# Patient Record
Sex: MALE | Race: BLACK OR AFRICAN AMERICAN | NOT HISPANIC OR LATINO | Employment: FULL TIME | URBAN - METROPOLITAN AREA
[De-identification: names, ages, dates, MRNs, and addresses within clinical notes are randomized per-mention and may not be internally consistent; named-entity substitution may affect disease eponyms.]

---

## 2020-12-08 LAB — CRC RECOMMENDATION EXT: NORMAL

## 2022-10-31 DIAGNOSIS — G31.84 MILD COGNITIVE IMPAIRMENT: Primary | ICD-10-CM

## 2023-01-19 ENCOUNTER — OFFICE VISIT (OUTPATIENT)
Dept: NEUROLOGY | Facility: CLINIC | Age: 63
End: 2023-01-19
Payer: OTHER GOVERNMENT

## 2023-01-19 VITALS
DIASTOLIC BLOOD PRESSURE: 74 MMHG | SYSTOLIC BLOOD PRESSURE: 110 MMHG | HEIGHT: 71 IN | BODY MASS INDEX: 28 KG/M2 | WEIGHT: 200 LBS

## 2023-01-19 DIAGNOSIS — G31.84 MILD COGNITIVE IMPAIRMENT: Primary | ICD-10-CM

## 2023-01-19 PROCEDURE — 99999 PR PBB SHADOW E&M-EST. PATIENT-LVL III: ICD-10-PCS | Mod: PBBFAC,,, | Performed by: PSYCHIATRY & NEUROLOGY

## 2023-01-19 PROCEDURE — 99204 OFFICE O/P NEW MOD 45 MIN: CPT | Mod: S$PBB,,, | Performed by: PSYCHIATRY & NEUROLOGY

## 2023-01-19 PROCEDURE — 99999 PR PBB SHADOW E&M-EST. PATIENT-LVL III: CPT | Mod: PBBFAC,,, | Performed by: PSYCHIATRY & NEUROLOGY

## 2023-01-19 PROCEDURE — 99204 PR OFFICE/OUTPT VISIT, NEW, LEVL IV, 45-59 MIN: ICD-10-PCS | Mod: S$PBB,,, | Performed by: PSYCHIATRY & NEUROLOGY

## 2023-01-19 PROCEDURE — 99213 OFFICE O/P EST LOW 20 MIN: CPT | Mod: PBBFAC | Performed by: PSYCHIATRY & NEUROLOGY

## 2023-01-19 RX ORDER — UBIDECARENONE 75 MG
500 CAPSULE ORAL DAILY
COMMUNITY

## 2023-01-19 RX ORDER — ESOMEPRAZOLE MAGNESIUM 40 MG/1
1 CAPSULE, DELAYED RELEASE ORAL DAILY
COMMUNITY
End: 2023-09-13 | Stop reason: SDUPTHER

## 2023-01-19 RX ORDER — ATORVASTATIN CALCIUM 20 MG/1
20 TABLET, FILM COATED ORAL NIGHTLY
COMMUNITY
Start: 2023-01-02 | End: 2023-06-24 | Stop reason: SDUPTHER

## 2023-01-19 RX ORDER — FLAXSEED OIL 1000 MG
1 CAPSULE ORAL DAILY
COMMUNITY

## 2023-01-19 RX ORDER — AMOXICILLIN 500 MG
1 CAPSULE ORAL DAILY
COMMUNITY

## 2023-01-19 RX ORDER — TELMISARTAN AND HYDROCHLORTHIAZIDE 80; 12.5 MG/1; MG/1
0.5 TABLET ORAL DAILY
COMMUNITY
Start: 2023-01-03

## 2023-01-19 RX ORDER — VITAMIN K2 40 MCG
500 TABLET ORAL DAILY
COMMUNITY

## 2023-01-19 RX ORDER — FOLIC ACID 0.4 MG
400 TABLET ORAL DAILY
COMMUNITY

## 2023-01-19 RX ORDER — NAPROXEN SODIUM 220 MG/1
1 TABLET, FILM COATED ORAL DAILY
COMMUNITY

## 2023-01-19 RX ORDER — MONTELUKAST SODIUM 10 MG/1
10 TABLET ORAL DAILY
COMMUNITY
Start: 2023-01-13 | End: 2023-09-13 | Stop reason: SDUPTHER

## 2023-01-19 RX ORDER — TAMSULOSIN HYDROCHLORIDE 0.4 MG/1
1 CAPSULE ORAL NIGHTLY
COMMUNITY
Start: 2022-09-08

## 2023-01-19 RX ORDER — IBUPROFEN 800 MG/1
1 TABLET ORAL DAILY PRN
COMMUNITY
Start: 2022-08-05

## 2023-01-19 RX ORDER — TRAMADOL HYDROCHLORIDE 50 MG/1
1 TABLET ORAL
COMMUNITY
End: 2023-09-13

## 2023-01-19 RX ORDER — TADALAFIL 10 MG/1
10 TABLET ORAL
COMMUNITY
Start: 2023-01-13 | End: 2023-09-13 | Stop reason: ALTCHOICE

## 2023-01-19 RX ORDER — FINASTERIDE 5 MG/1
5 TABLET, FILM COATED ORAL DAILY
COMMUNITY
Start: 2022-12-16

## 2023-01-19 RX ORDER — VIT C/E/ZN/COPPR/LUTEIN/ZEAXAN 250MG-90MG
1000 CAPSULE ORAL DAILY
COMMUNITY

## 2023-01-19 NOTE — PROGRESS NOTES
Subjective:       Patient ID: Joey Hunt is a 62 y.o. male.    Chief Complaint: Mild cognitive impairment (New Pt - Referred by [facility] - Neuro Consult - MCI/MMSE 28/30)    HPI  2 concussions in the service in the 1970s  Lost consciousnsess with one  Never required hospitalized  Was always able to hold a job, pay bills, function in society  He has some mild memory issues  ;feels lke it is getting worse  Alzheimer's does not run in the family  Misplaces things at home  Driving is ok; occ misses a turn  Works in [employer]; does ok  Mmse 28/30; normal spatial; haim a more cmplicated picture than the task    Mri report only; was done as well outpatient; posterior WM changes; [facility]    Review of Systems  Endorses anxiety    The remainder of the 14 system ROS is noncontributory or negative unless mentioned/reviewed above.    Objective:      Physical Exam  Mental Status: Alert and oriented x3. Language is fluent with good comprehension.    Cranial Nerve: Pupils are equal, round, and reactive to light. Visual fields are intact to confrontation. Normal fundi. Ocular movements are intact. Face is symmetric at rest and with activation with intact sensation throughout. Hearing intact to finger rub bilaterally. Muscles of tongue and palate activate symmetrically. No dysarthria. Strength is full in sternocleidomastoid and trapezius bilaterally.    Motor: Muscle bulk and tone are normal. Strength is 5/5 in all four extremities both proximally and distally. Intact fine motor movements bilaterally. There is no pronator drift or satelliting on arm roll.    Sensory: Sensation is intact to light touch, pinprick, vibration, and proprioception throughout. Romberg is negative.    Reflexes: 2+ and symmetric at the biceps, triceps, brachioradialis, patella, and Achilles bilaterally. Plantar response is flexor bilaterally.    Coordination: No dysmetria on finger-nose-finger or heel-knee-shin.  Normal rapid alternating movements. Fast finger tapping with normal amplitude and speed.    Gait: Narrow based with normal stride length and good arm swing bilaterally. Able to walk on heels, toes, and in tandem.      Assessment:       Problem List Items Addressed This Visit    None      Plan:       Vascular MCI  Control Rfs  Exercise  Continue supplements  Occ tension HA

## 2023-06-24 DIAGNOSIS — E78.5 HYPERLIPIDEMIA, UNSPECIFIED HYPERLIPIDEMIA TYPE: Primary | ICD-10-CM

## 2023-06-24 RX ORDER — ATORVASTATIN CALCIUM 20 MG/1
20 TABLET, FILM COATED ORAL NIGHTLY
Qty: 90 TABLET | Refills: 1 | Status: SHIPPED | OUTPATIENT
Start: 2023-06-24 | End: 2023-09-13 | Stop reason: SDUPTHER

## 2023-08-16 LAB
CHOLESTEROL, TOTAL: 145
HDLC SERPL-MCNC: 63 MG/DL (ref 35–70)
LDLC SERPL CALC-MCNC: 61 MG/DL (ref 0–160)
TRIGL SERPL-MCNC: 121 MG/DL (ref 40–160)
VLDLC SERPL-MCNC: 21 MG/DL

## 2023-08-18 ENCOUNTER — DOCUMENTATION ONLY (OUTPATIENT)
Dept: FAMILY MEDICINE | Facility: CLINIC | Age: 63
End: 2023-08-18
Payer: COMMERCIAL

## 2023-09-08 PROBLEM — F10.11 H/O ETOH ABUSE: Status: ACTIVE | Noted: 2023-09-08

## 2023-09-08 PROBLEM — E78.00 HYPERCHOLESTEROLEMIA: Status: RESOLVED | Noted: 2023-09-08 | Resolved: 2023-09-08

## 2023-09-08 PROBLEM — E78.2 MIXED HYPERLIPIDEMIA: Status: ACTIVE | Noted: 2023-09-08

## 2023-09-08 PROBLEM — E78.00 HYPERCHOLESTEROLEMIA: Status: ACTIVE | Noted: 2023-09-08

## 2023-09-08 PROBLEM — R79.89 ELEVATED LFTS: Status: ACTIVE | Noted: 2023-09-08

## 2023-09-08 PROBLEM — E53.8 FOLATE DEFICIENCY: Status: ACTIVE | Noted: 2023-09-08

## 2023-09-08 PROBLEM — K21.9 GERD (GASTROESOPHAGEAL REFLUX DISEASE): Status: ACTIVE | Noted: 2023-09-08

## 2023-09-08 PROBLEM — I10 HTN (HYPERTENSION): Status: ACTIVE | Noted: 2023-09-08

## 2023-09-08 PROBLEM — E55.9 VITAMIN D DEFICIENCY: Status: ACTIVE | Noted: 2023-09-08

## 2023-09-13 ENCOUNTER — OFFICE VISIT (OUTPATIENT)
Dept: FAMILY MEDICINE | Facility: CLINIC | Age: 63
End: 2023-09-13
Payer: COMMERCIAL

## 2023-09-13 VITALS
RESPIRATION RATE: 20 BRPM | TEMPERATURE: 98 F | HEIGHT: 70 IN | DIASTOLIC BLOOD PRESSURE: 88 MMHG | OXYGEN SATURATION: 98 % | BODY MASS INDEX: 27.63 KG/M2 | WEIGHT: 193 LBS | HEART RATE: 80 BPM | SYSTOLIC BLOOD PRESSURE: 138 MMHG

## 2023-09-13 DIAGNOSIS — E78.2 MIXED HYPERLIPIDEMIA: Primary | ICD-10-CM

## 2023-09-13 DIAGNOSIS — E55.9 VITAMIN D DEFICIENCY: ICD-10-CM

## 2023-09-13 DIAGNOSIS — Z88.9 HX OF SEASONAL ALLERGIES: ICD-10-CM

## 2023-09-13 DIAGNOSIS — K21.9 GASTROESOPHAGEAL REFLUX DISEASE, UNSPECIFIED WHETHER ESOPHAGITIS PRESENT: ICD-10-CM

## 2023-09-13 DIAGNOSIS — Z79.899 ENCOUNTER FOR LONG-TERM (CURRENT) USE OF OTHER MEDICATIONS: ICD-10-CM

## 2023-09-13 PROBLEM — F10.10 ETOH ABUSE: Status: ACTIVE | Noted: 2023-09-13

## 2023-09-13 PROBLEM — F10.11 H/O ETOH ABUSE: Status: RESOLVED | Noted: 2023-09-08 | Resolved: 2023-09-13

## 2023-09-13 PROCEDURE — 99214 PR OFFICE/OUTPT VISIT, EST, LEVL IV, 30-39 MIN: ICD-10-PCS | Mod: ,,, | Performed by: FAMILY MEDICINE

## 2023-09-13 PROCEDURE — 99214 OFFICE O/P EST MOD 30 MIN: CPT | Mod: ,,, | Performed by: FAMILY MEDICINE

## 2023-09-13 RX ORDER — TADALAFIL 20 MG/1
20 TABLET ORAL DAILY PRN
COMMUNITY
Start: 2023-06-22

## 2023-09-13 RX ORDER — MONTELUKAST SODIUM 10 MG/1
10 TABLET ORAL DAILY
Qty: 90 TABLET | Refills: 3 | Status: SHIPPED | OUTPATIENT
Start: 2023-09-13

## 2023-09-13 RX ORDER — ESOMEPRAZOLE MAGNESIUM 40 MG/1
40 CAPSULE, DELAYED RELEASE ORAL DAILY
Qty: 90 CAPSULE | Refills: 3 | Status: SHIPPED | OUTPATIENT
Start: 2023-09-13

## 2023-09-13 RX ORDER — ATORVASTATIN CALCIUM 20 MG/1
20 TABLET, FILM COATED ORAL NIGHTLY
Qty: 90 TABLET | Refills: 3 | Status: SHIPPED | OUTPATIENT
Start: 2023-09-13

## 2023-09-13 NOTE — PROGRESS NOTES
"  Patient Name: Joey Hunt     Patient ID: [id number]     Chief Complaint: Results (Here for lab results.)      HPI:     Joey Hunt is a 62 y.o. male here today for follow up and lab work results. Reviewed and discussed lab work results.    Past Medical History:   Diagnosis Date    Allergies     Chronic venous insufficiency     Elevated LFTs     Folate deficiency     GERD (gastroesophageal reflux disease)     H/O ETOH abuse     History of gross hematuria     HTN (hypertension)     Hypercholesterolemia     Memory impairment     Mixed hyperlipidemia     Prostate enlargement     Vitamin D deficiency         Past Surgical History:   Procedure Laterality Date    LEFT HEART CATHETERIZATION      NASAL SEPTUM SURGERY          Social History     Socioeconomic History    Marital status:     Number of children: 2   Tobacco Use    Smoking status: Former     Current packs/day: 0.00     Types: Cigarettes     Quit date:      Years since quittin.7    Smokeless tobacco: Never   Substance and Sexual Activity    Alcohol use: Yes     Alcohol/week: 12.0 standard drinks of alcohol     Types: 12 Shots of liquor per week    Drug use: Not Currently     Types: Cocaine, Marijuana, "Crack" cocaine     Comment: Former        Current Outpatient Medications   Medication Instructions    arginine 500 mg, Oral, Daily    aspirin 81 MG Chew 1 tablet, Oral, Daily    atorvastatin (LIPITOR) 20 mg, Oral, Nightly    cholecalciferol (vitamin D3) (VITAMIN D3) 1,000 Units, Oral, Daily    COD LIVER OIL ORAL 1 capsule, Oral, Daily    cyanocobalamin 500 mcg, Oral, Daily    esomeprazole (NEXIUM) 40 mg, Oral, Daily    finasteride (PROSCAR) 5 mg, Oral, Daily    flaxseed oiL 1,000 mg Cap 1 capsule, Oral, Daily    folic acid (FOLVITE) 400 mcg, Oral, Daily    ibuprofen (ADVIL,MOTRIN) 800 MG tablet 1 tablet, Oral, Daily PRN    montelukast (SINGULAIR) 10 mg, Oral, Daily, prn    multivit-mins/iron/folic/lycop (CENTRUM MEN ORAL) 1 tablet, Oral, Daily    " "omega-3 fatty acids/fish oil (FISH OIL-OMEGA-3 FATTY ACIDS) 300-1,000 mg capsule 1 capsule, Oral, Daily    Panax ginseng root 1,000 mg Tab 1 tablet, Oral, Daily    tadalafiL (CIALIS) 20 mg, Oral, Daily PRN    tamsulosin (FLOMAX) 0.4 mg Cap 1 capsule, Oral, Nightly    telmisartan-hydrochlorothiazide (MICARDIS HCT) 80-12.5 mg per tablet 0.5 tablets, Oral, Daily    ubidecarenone/vitamin E mixed (COQ10  ORAL) 1 capsule, Oral, Daily       Review of patient's allergies indicates:  No Known Allergies       There is no immunization history on file for this patient.    Patient Care Team:  Eliezer Stapleton Sr., MD as PCP - General (Family Medicine)  Clinic, [facility]  Tramaine Gonzales MD as Consulting Physician (Urology)  Cayetano Nicholas MD as Consulting Physician (Cardiology)     Subjective:     Review of Systems    10 point review of systems conducted, negative except as stated in the history of present illness. See HPI for details.    Objective:     Visit Vitals  /88 (BP Location: Left arm, Patient Position: Sitting, BP Method: Medium (Manual))   Pulse 80   Temp 97.5 °F (36.4 °C)   Resp 20   Ht 5' 10" (1.778 m)   Wt 87.5 kg (193 lb)   SpO2 98%   BMI 27.69 kg/m²       Physical Exam  Constitutional:       Appearance: Normal appearance.   HENT:      Head: Normocephalic and atraumatic.   Cardiovascular:      Rate and Rhythm: Normal rate and regular rhythm.   Pulmonary:      Effort: Pulmonary effort is normal.      Breath sounds: Normal breath sounds.   Abdominal:      Palpations: Abdomen is soft.      Tenderness: There is no abdominal tenderness.   Musculoskeletal:         General: Normal range of motion.      Cervical back: Normal range of motion and neck supple.      Right lower leg: No edema.      Left lower leg: No edema.   Lymphadenopathy:      Cervical: No cervical adenopathy.   Skin:     General: Skin is warm and dry.   Neurological:      General: No focal deficit present.      Mental " Status: He is alert and oriented to person, place, and time.   Psychiatric:         Mood and Affect: Mood normal.           Assessment:       ICD-10-CM ICD-9-CM   1. Mixed hyperlipidemia  E78.2 272.2   2. Gastroesophageal reflux disease, unspecified whether esophagitis present  K21.9 530.81   3. Vitamin D deficiency  E55.9 268.9   4. Hx of seasonal allergies  Z88.9 V15.09   5. Encounter for long-term (current) use of other medications  Z79.899 V58.69        Plan:     1. Mixed hyperlipidemia  Overview:  Continue with Lipitor 20 mg nightly.  Patient is well controlled.  Stressed importance of dietary modifications. Follow a low cholesterol, low saturated fat diet with less that 200mg of cholesterol a day.  Avoid fried foods and high saturated fats (high saturated fats less than 7% of calories).  Add Flax Seed/Fish Oil supplements to diet. Increase dietary fiber.  Regular exercise can reduce LDL and raise HDL. Stressed importance of physical activity 5 times per week for 30 minutes per day.     Assessment & Plan:  Most recent LDL 61 mg/dL on 08/16/2023.    Orders:  -     atorvastatin (LIPITOR) 20 MG tablet; Take 1 tablet (20 mg total) by mouth every evening.  Dispense: 90 tablet; Refill: 3  -     Lipid Panel; Future; Expected date: 03/13/2024    2. Gastroesophageal reflux disease, unspecified whether esophagitis present  Overview:  Continue with Nexium 40 mg daily.  Patient is well controlled.  Avoid spicy, acidic, fried foods and alcohol.  Eat 2-3 hours before going to bed.  Avoid tight clothing, chew food thoroughly.  Reduce caffeine intake, avoid soda.    Orders:  -     esomeprazole (NEXIUM) 40 MG capsule; Take 1 capsule (40 mg total) by mouth Daily.  Dispense: 90 capsule; Refill: 3    3. Vitamin D deficiency  Overview:  Continue with OTC Vitamin D3 1,000 units daily.  Patient is well controlled.    Assessment & Plan:  Most recent Vitamin D 34.1 ng/mL on 08/16/2023.      4. Hx of seasonal allergies  Comments:  We  are going to refill his prescription for Singulair 10 mg daily.    5. Encounter for long-term (current) use of other medications  Overview:  Patient was instructed to continue with the prescribed medications as no adverse or cost issues were found.   Refills were given if needed.  Compliance issues were discussed as far as medications that patient is currently taking.  Discussed the possibility of getting off some medications that were not absolutely necessary.    Orders:  -     montelukast (SINGULAIR) 10 mg tablet; Take 1 tablet (10 mg total) by mouth once daily. prn  Dispense: 90 tablet; Refill: 3  -     Hepatic Function Panel; Future; Expected date: 03/13/2024        [x] Discussed lab findings with the patient.  [x] Discussed diet, exercise and if appropriate, weight loss.  [x] Instructions and information, including risks and benefits of prescribed medication(s) have been reviewed with the patient and patient verbalizes understanding. Questions have been answered to the patient's satisfaction.  [] Appropriate counseling has been given regarding anxiety and depressive issues that were discussed today.  [] Any lab drawn today will be reviewed by physician at the time it is received and appropriate recommendations bill be made and discussed with patient.     Follow up in about 6 months (around 3/13/2024) for Follow Up, With Fasting Labs prior to visit.   In addition to their scheduled follow up, the patient has also been instructed to follow up on as needed basis.     Future Appointments   Date Time Provider Department Center   3/13/2024  8:00 AM NURSE, [facility]   3/18/2024  8:30 AM Eliezer Stapleton Sr., MD [facility]ap[facility] Sr, MD

## 2024-03-18 ENCOUNTER — OFFICE VISIT (OUTPATIENT)
Dept: FAMILY MEDICINE | Facility: CLINIC | Age: 64
End: 2024-03-18
Payer: COMMERCIAL

## 2024-03-18 ENCOUNTER — DOCUMENTATION ONLY (OUTPATIENT)
Dept: FAMILY MEDICINE | Facility: CLINIC | Age: 64
End: 2024-03-18

## 2024-03-18 VITALS
RESPIRATION RATE: 20 BRPM | HEART RATE: 60 BPM | SYSTOLIC BLOOD PRESSURE: 120 MMHG | HEIGHT: 70 IN | TEMPERATURE: 97 F | BODY MASS INDEX: 28.63 KG/M2 | WEIGHT: 200 LBS | DIASTOLIC BLOOD PRESSURE: 80 MMHG | OXYGEN SATURATION: 96 %

## 2024-03-18 DIAGNOSIS — Z79.899 ENCOUNTER FOR LONG-TERM CURRENT USE OF MEDICATION: ICD-10-CM

## 2024-03-18 DIAGNOSIS — S00.252A FOREIGN BODY OF LEFT EYELID: Primary | ICD-10-CM

## 2024-03-18 DIAGNOSIS — Z12.5 SCREENING PSA (PROSTATE SPECIFIC ANTIGEN): ICD-10-CM

## 2024-03-18 DIAGNOSIS — E78.2 MIXED HYPERLIPIDEMIA: ICD-10-CM

## 2024-03-18 DIAGNOSIS — I10 PRIMARY HYPERTENSION: ICD-10-CM

## 2024-03-18 PROBLEM — S00.259A: Status: ACTIVE | Noted: 2024-03-18

## 2024-03-18 PROCEDURE — 3008F BODY MASS INDEX DOCD: CPT | Mod: CPTII,,, | Performed by: FAMILY MEDICINE

## 2024-03-18 PROCEDURE — 3079F DIAST BP 80-89 MM HG: CPT | Mod: CPTII,,, | Performed by: FAMILY MEDICINE

## 2024-03-18 PROCEDURE — 1159F MED LIST DOCD IN RCRD: CPT | Mod: CPTII,,, | Performed by: FAMILY MEDICINE

## 2024-03-18 PROCEDURE — 3074F SYST BP LT 130 MM HG: CPT | Mod: CPTII,,, | Performed by: FAMILY MEDICINE

## 2024-03-18 PROCEDURE — 99214 OFFICE O/P EST MOD 30 MIN: CPT | Mod: ,,, | Performed by: FAMILY MEDICINE

## 2024-03-18 NOTE — PROGRESS NOTES
"  Patient Name: Joey Hunt     Patient ID: [id number]     Chief Complaint: Results (Patient here for lab results.)      HPI:     Joey Hunt is a 63 y.o. male here today for follow up on hypertension & hyperlipidemia.  Incidentally patient says he feels like something is in his left eye, especially when he blinks.  Past Medical History:   Diagnosis Date    Allergies     Chronic venous insufficiency     Elevated LFTs     Folate deficiency     GERD (gastroesophageal reflux disease)     H/O ETOH abuse     History of gross hematuria     HTN (hypertension)     Hypercholesterolemia     Memory impairment     Mixed hyperlipidemia     Prostate enlargement     Vitamin D deficiency         Past Surgical History:   Procedure Laterality Date    COLONOSCOPY  2020    Dr Velasquez repeat 4 years    LEFT HEART CATHETERIZATION      NASAL SEPTUM SURGERY          Social History     Socioeconomic History    Marital status:     Number of children: 2   Tobacco Use    Smoking status: Former     Current packs/day: 0.00     Types: Cigarettes     Quit date:      Years since quittin.2    Smokeless tobacco: Never   Substance and Sexual Activity    Alcohol use: Yes     Alcohol/week: 8.0 standard drinks of alcohol     Types: 8 Shots of liquor per week    Drug use: Not Currently     Types: Cocaine, Marijuana, "Crack" cocaine     Comment: Former        Current Outpatient Medications   Medication Instructions    arginine 500 mg, Oral, Daily    aspirin 81 MG Chew 1 tablet, Oral, Daily    atorvastatin (LIPITOR) 20 mg, Oral, Nightly    cholecalciferol (vitamin D3) (VITAMIN D3) 1,000 Units, Oral, Daily    COD LIVER OIL ORAL 1 capsule, Oral, Daily    cyanocobalamin 500 mcg, Oral, Daily    esomeprazole (NEXIUM) 40 mg, Oral, Daily    finasteride (PROSCAR) 5 mg, Oral, Daily    flaxseed oiL 1,000 mg Cap 1 capsule, Oral, Daily    folic acid (FOLVITE) 400 mcg, Oral, Daily    ibuprofen (ADVIL,MOTRIN) 800 MG tablet 1 tablet, Oral, Daily PRN    " "montelukast (SINGULAIR) 10 mg, Oral, Daily, prn    multivit-mins/iron/folic/lycop (CENTRUM MEN ORAL) 1 tablet, Oral, Daily    omega-3 fatty acids/fish oil (FISH OIL-OMEGA-3 FATTY ACIDS) 300-1,000 mg capsule 1 capsule, Oral, Daily    Panax ginseng root 1,000 mg Tab 1 tablet, Oral, Daily    tadalafiL (CIALIS) 20 mg, Oral, Daily PRN    tamsulosin (FLOMAX) 0.4 mg Cap 1 capsule, Oral, Nightly    telmisartan-hydrochlorothiazide (MICARDIS HCT) 80-12.5 mg per tablet 0.5 tablets, Oral, Daily    ubidecarenone/vitamin E mixed (COQ10  ORAL) 1 capsule, Oral, Daily       Review of patient's allergies indicates:  No Known Allergies       There is no immunization history on file for this patient.    Patient Care Team:  Eliezer Stapleton Sr., MD as PCP - General (Family Medicine)  Clinic, [facility]  Tramaine Gonzales MD as Consulting Physician (Urology)  Cayetano Nicholas MD as Consulting Physician (Cardiology)  Jewell Laguna OD (Optometry)     Subjective:     Review of Systems    10 point review of systems conducted, negative except as stated in the history of present illness. See HPI for details.    Objective:     Visit Vitals  /80 (BP Location: Left arm, Patient Position: Sitting, BP Method: Large (Manual))   Pulse 60   Temp 97.4 °F (36.3 °C)   Resp 20   Ht 5' 10" (1.778 m)   Wt 90.7 kg (200 lb)   SpO2 96%   BMI 28.70 kg/m²       Physical Exam  Constitutional:       Appearance: Normal appearance.   HENT:      Head: Normocephalic and atraumatic.   Eyes:      Extraocular Movements: Extraocular movements intact.      Conjunctiva/sclera: Conjunctivae normal.      Pupils: Pupils are equal, round, and reactive to light.      Comments: There is a tiny black foreign body underneath the left upper lid.  It was removed using a moistened sterile cotton swab.   Cardiovascular:      Rate and Rhythm: Normal rate and regular rhythm.   Pulmonary:      Effort: Pulmonary effort is normal.      Breath sounds: " Normal breath sounds.   Abdominal:      Palpations: Abdomen is soft.      Tenderness: There is no abdominal tenderness.   Musculoskeletal:         General: No swelling or tenderness.      Cervical back: Normal range of motion and neck supple.      Right lower leg: No edema.      Left lower leg: No edema.   Lymphadenopathy:      Cervical: No cervical adenopathy.   Skin:     General: Skin is warm and dry.   Neurological:      General: No focal deficit present.      Mental Status: He is alert and oriented to person, place, and time.   Psychiatric:         Mood and Affect: Mood normal.         Assessment:       ICD-10-CM ICD-9-CM   1. Foreign body removal of left eyelid  S00.252A 930.1   2. Mixed hyperlipidemia  E78.2 272.2   3. Primary hypertension  I10 401.9   4. Encounter for long-term current use of medication  Z79.899 V58.69   5. Screening PSA (prostate specific antigen)  Z12.5 V76.44       Plan:   1. Foreign body removal of left eyelid  Comments:  Patient had a small foreign body attached to the inside of the left upper eyelid.  It was removed using a moistened sterile cotton tip applicator.    2. Mixed hyperlipidemia  Overview:  Continue with Lipitor 20 mg nightly.    Stressed importance of dietary modifications. Follow a low cholesterol, low saturated fat diet with less that 200mg of cholesterol a day.  Avoid fried foods and high saturated fats (high saturated fats less than 7% of calories).  Add Flax Seed/Fish Oil supplements to diet. Increase dietary fiber.  Regular exercise can reduce LDL and raise HDL. Stressed importance of physical activity 5 times per week for 30 minutes per day.     Assessment & Plan:  Lab Results   Component Value Date    TRIG 68 03/13/2024    HDL 76 03/13/2024    LDLCALC 70 03/13/2024     Patient is at goal.        3. Primary hypertension  Overview:  Continue with Aspirin 81 mg daily + Micardis HCT 80-12.5 mg 1/2tab daily.      Low Sodium Diet (DASH Diet - Less than 2 grams of sodium  per day).  Monitor blood pressure daily and log. Report consistent numbers greater than 140/90.  Maintain healthy weight with goal BMI <30. Exercise 30 minutes per day, 5 days per week.    Assessment & Plan:  Pt. Is well controlled and at goal. He is to continue being monitored by cardiologist.      4. Encounter for long-term current use of medication  Overview:  Patient was instructed to continue with the prescribed medications as no adverse or cost issues were found. Compliance issues were discussed as far as medications that patient is currently taking.  Discussed the possibility of getting off some medications that were not absolutely necessary.       5. Screening PSA (prostate specific antigen)  Overview:  We will continue to screen the patient for prostate cancer by performing PSAs at the proper intervals.           [x] Discussed lab findings with the patient.  [x] Discussed diet, exercise and if appropriate, weight loss.  [] Instructions and information, including risks and benefits of prescribed medication(s) have been reviewed with the patient and patient verbalizes understanding. Questions have been answered to the patient's satisfaction.  [] Appropriate counseling has been given regarding anxiety and depressive issues that were discussed today.  [] Any lab drawn today will be reviewed by physician at the time it is received and appropriate recommendations bill be made and discussed with patient.     Follow up in about 6 months (around 9/18/2024) for With Fasting Labs prior to visit, [facility].   In addition to their scheduled follow up, the patient has also been instructed to follow up on as needed basis.     Future Appointments   Date Time Provider Department Center   9/18/2024  8:00 AM LAB, [facility]   9/23/2024  8:00 AM Eliezer Stapleton Sr., MD [facility] Jb Bourgeois Sr, MD

## 2024-03-18 NOTE — ASSESSMENT & PLAN NOTE
Lab Results   Component Value Date    TRIG 68 03/13/2024    HDL 76 03/13/2024    LDLCALC 70 03/13/2024     Patient is at goal.

## 2024-09-25 ENCOUNTER — OFFICE VISIT (OUTPATIENT)
Dept: FAMILY MEDICINE | Facility: CLINIC | Age: 64
End: 2024-09-25
Payer: COMMERCIAL

## 2024-09-25 VITALS
BODY MASS INDEX: 27.92 KG/M2 | DIASTOLIC BLOOD PRESSURE: 72 MMHG | RESPIRATION RATE: 20 BRPM | SYSTOLIC BLOOD PRESSURE: 116 MMHG | TEMPERATURE: 98 F | OXYGEN SATURATION: 96 % | WEIGHT: 195 LBS | HEIGHT: 70 IN | HEART RATE: 68 BPM

## 2024-09-25 DIAGNOSIS — J42 CHRONIC BRONCHITIS, UNSPECIFIED CHRONIC BRONCHITIS TYPE: Primary | ICD-10-CM

## 2024-09-25 DIAGNOSIS — E78.2 MIXED HYPERLIPIDEMIA: ICD-10-CM

## 2024-09-25 DIAGNOSIS — F33.1 MAJOR DEPRESSIVE DISORDER, RECURRENT, MODERATE: ICD-10-CM

## 2024-09-25 DIAGNOSIS — I10 PRIMARY HYPERTENSION: ICD-10-CM

## 2024-09-25 PROCEDURE — 99396 PREV VISIT EST AGE 40-64: CPT | Mod: ,,, | Performed by: FAMILY MEDICINE

## 2024-09-25 PROCEDURE — 3044F HG A1C LEVEL LT 7.0%: CPT | Mod: CPTII,,, | Performed by: FAMILY MEDICINE

## 2024-09-25 PROCEDURE — 3074F SYST BP LT 130 MM HG: CPT | Mod: CPTII,,, | Performed by: FAMILY MEDICINE

## 2024-09-25 PROCEDURE — 1160F RVW MEDS BY RX/DR IN RCRD: CPT | Mod: CPTII,,, | Performed by: FAMILY MEDICINE

## 2024-09-25 PROCEDURE — 3008F BODY MASS INDEX DOCD: CPT | Mod: CPTII,,, | Performed by: FAMILY MEDICINE

## 2024-09-25 PROCEDURE — 3078F DIAST BP <80 MM HG: CPT | Mod: CPTII,,, | Performed by: FAMILY MEDICINE

## 2024-09-25 PROCEDURE — 1159F MED LIST DOCD IN RCRD: CPT | Mod: CPTII,,, | Performed by: FAMILY MEDICINE

## 2024-09-25 RX ORDER — TRAZODONE HYDROCHLORIDE 50 MG/1
25 TABLET ORAL NIGHTLY PRN
COMMUNITY
Start: 2024-09-12

## 2024-09-25 RX ORDER — SERTRALINE HYDROCHLORIDE 50 MG/1
25 TABLET, FILM COATED ORAL DAILY
COMMUNITY
Start: 2024-09-12 | End: 2024-09-25

## 2024-09-25 RX ORDER — SERTRALINE HYDROCHLORIDE 100 MG/1
50 TABLET, FILM COATED ORAL DAILY
COMMUNITY
Start: 2024-09-25

## 2024-09-25 NOTE — ASSESSMENT & PLAN NOTE
[facility] manages this nebulous chronic bronchitis and had x-rays a couple of weeks  Continue following up with the [facility] for monitoring  Lung exam normal today

## 2024-09-25 NOTE — PROGRESS NOTES
"  Family Medicine    Patient ID: [id number]     Chief Complaint: Annual Exam and Results (Patient here for lab results.)      HPI:     Joey Hunt is a 63 y.o. male here today for a follow up.     Past Medical History:   Diagnosis Date    Allergies     Chronic venous insufficiency     Elevated LFTs     Folate deficiency     GERD (gastroesophageal reflux disease)     H/O ETOH abuse     History of gross hematuria     HTN (hypertension)     Hypercholesterolemia     Memory impairment     Mixed hyperlipidemia     Prostate enlargement     PTSD (post[facility] stress [facility]    Vitamin D deficiency         Past Surgical History:   Procedure Laterality Date    COLONOSCOPY  2020    Dr Velasquez repeat 4 years    LEFT HEART CATHETERIZATION      NASAL SEPTUM SURGERY          Social History     Tobacco Use    Smoking status: Former     Current packs/day: 0.00     Types: Cigarettes     Quit date:      Years since quittin.7    Smokeless tobacco: Never   Substance and Sexual Activity    Alcohol use: Yes     Alcohol/week: 8.0 standard drinks of alcohol     Types: 8 Shots of liquor per week    Drug use: Not Currently     Types: Cocaine, Marijuana, "Crack" cocaine     Comment: Former    Sexual activity: Not on file        Current Outpatient Medications   Medication Instructions    arginine 500 mg, Oral, Daily    aspirin 81 MG Chew 1 tablet, Oral, Daily    atorvastatin (LIPITOR) 20 mg, Oral, Nightly    cholecalciferol (vitamin D3) (VITAMIN D3) 1,000 Units, Oral, Daily    COD LIVER OIL ORAL 1 capsule, Oral, Daily    cyanocobalamin 500 mcg, Oral, Daily    esomeprazole (NEXIUM) 40 mg, Oral, Daily    finasteride (PROSCAR) 5 mg, Oral, Daily    flaxseed oiL 1,000 mg Cap 1 capsule, Oral, Daily    folic acid (FOLVITE) 400 mcg, Oral, Daily    ibuprofen (ADVIL,MOTRIN) 800 MG tablet 1 tablet, Oral, Daily PRN    montelukast (SINGULAIR) 10 mg, Oral, Daily, prn    multivit-mins/iron/folic/lycop (CENTRUM MEN ORAL) 1 tablet, " "Oral, Daily    omega-3 fatty acids/fish oil (FISH OIL-OMEGA-3 FATTY ACIDS) 300-1,000 mg capsule 1 capsule, Oral, Daily    Panax ginseng root 1,000 mg Tab 1 tablet, Oral, Daily    sertraline (ZOLOFT) 50 mg, Oral, Daily, Patient has not started yet    sertraline (ZOLOFT) 25 mg, Oral, Daily, Patient has not started yet    tadalafiL (CIALIS) 20 mg, Oral, Daily PRN    tamsulosin (FLOMAX) 0.4 mg Cap 1 capsule, Oral, Nightly    telmisartan-hydrochlorothiazide (MICARDIS HCT) 80-12.5 mg per tablet 0.5 tablets, Oral, Daily    traZODone (DESYREL) 25 mg, Oral, Nightly PRN    ubidecarenone/vitamin E mixed (COQ10  ORAL) 1 capsule, Oral, Daily       Review of patient's allergies indicates:  No Known Allergies     Patient Care Team:  Eliezer Stapleton Sr., MD as PCP - General (Family Medicine)  Clinic, [facility]  Tramaine Gonzales MD as Consulting Physician (Urology)  Cayetano Nicholas MD as Consulting Physician (Cardiology)  Jewell Laguna, CAROLEE (Optometry)     Subjective:     Review of Systems    12 point review of systems conducted, negative except as stated in the history of present illness. See HPI for details.    Objective:     Visit Vitals  /72   Pulse 68   Temp 97.6 °F (36.4 °C)   Resp 20   Ht 5' 10" (1.778 m)   Wt 88.5 kg (195 lb)   SpO2 96%   BMI 27.98 kg/m²       Physical Exam    Labs Reviewed:     Chemistry:  Lab Results   Component Value Date     09/18/2024    K 4.0 09/18/2024    BUN 14 09/18/2024    CREATININE 1.15 09/18/2024    EGFRNORACEVR 71 09/18/2024    CALCIUM 9.6 09/18/2024    LABPROT 13.3 11/13/2021    ALBUMIN 4.4 09/18/2024    BILIDIR <0.20 03/13/2024    AST 34 09/18/2024    ALT 27 09/18/2024    [id number] 38.2 09/18/2024    TSH 1.080 09/18/2024        Lab Results   Component Value Date    HGBA1C 5.3 09/18/2024        Hematology:  Lab Results   Component Value Date    WBC 6.4 09/18/2024    HGB 13.8 09/18/2024    HCT 41.5 09/18/2024     09/18/2024       Lipid " "Panel:  Lab Results   Component Value Date    CHOL 160 09/18/2024    HDL 78 09/18/2024    TRIG 78 09/18/2024        Urine:  No results found for: "COLORUA", "APPEARANCEUA", "SGUA", "PHUA", "PROTEINUA", "GLUCOSEUA", "KETONESUA", "BLOODUA", "NITRITESUA", "LEUKOCYTESUR", "RBCUA", "WBCUA", "BACTERIA", "SQEPUA", "HYALINECASTS", "CREATRANDUR", "PROTEINURINE", "UPROTCREA"     Assessment:       ICD-10-CM ICD-9-CM   1. Chronic bronchitis, unspecified chronic bronchitis type  J42 491.9   2. Major depressive disorder, recurrent, moderate  F33.1 296.32   3. Primary hypertension  I10 401.9   4. Mixed hyperlipidemia  E78.2 272.2        Plan:     1. Chronic bronchitis, unspecified chronic bronchitis type  Overview:  Exposed to burn pits while in the [employer]  Made some sort of claim for the exposure  Had repeated bouts of chronic bronchitis which essentially entailed chronic coughing  Has not had any coughing episodes in the last several years    Assessment & Plan:  [facility] manages this nebulous chronic bronchitis and had x-rays a couple of weeks  [facility] for [facility]  Lung exam normal today    Orders:  -     CBC Auto Differential; Future; Expected date: 03/25/2025  -     Comprehensive Metabolic Panel; Future; Expected date: 03/25/2025  -     Lipid Panel; Future; Expected date: 03/25/2025  -     TSH; Future; Expected date: 03/25/2025  -     Hemoglobin A1C; Future; Expected date: 03/25/2025  -     Urinalysis; Future; Expected date: 03/25/2025  -     PSA, Screening; Future; Expected date: 03/25/2025  -     Microalbumin/Creatinine Ratio, Urine; Future; Expected date: 03/25/2025  -     Vitamin D; Future; Expected date: 03/25/2025    2. Major depressive disorder, recurrent, moderate  Overview:  Takes Zoloft and trazodone, prescribed by [facility]  Has suffered from depression since getting discharge from the [employer]    Assessment & Plan:  Refill above medications as needed  Continue following up with the " [facility] for further evaluation as needed      3. Primary hypertension  Overview:  Continue with Aspirin 81 mg daily + Micardis HCT 80-12.5 mg 1/2tab daily.      Low Sodium Diet (DASH Diet - Less than 2 grams of sodium per day).  Monitor blood pressure daily and log. Report consistent numbers greater than 140/90.  Maintain healthy weight with goal BMI <30. Exercise 30 minutes per day, 5 days per week.    Assessment & Plan:  At goal   Continue above medication at same dose  Continue follow up with Cardiology    Orders:  -     CBC Auto Differential; Future; Expected date: 03/25/2025  -     Comprehensive Metabolic Panel; Future; Expected date: 03/25/2025  -     Lipid Panel; Future; Expected date: 03/25/2025  -     TSH; Future; Expected date: 03/25/2025  -     Hemoglobin A1C; Future; Expected date: 03/25/2025  -     Urinalysis; Future; Expected date: 03/25/2025  -     PSA, Screening; Future; Expected date: 03/25/2025  -     Microalbumin/Creatinine Ratio, Urine; Future; Expected date: 03/25/2025  -     Vitamin D; Future; Expected date: 03/25/2025    4. Mixed hyperlipidemia  Overview:  Continue with Lipitor 20 mg nightly.    Stressed importance of dietary modifications. Follow a low cholesterol, low saturated fat diet with less that 200mg of cholesterol a day.  Avoid fried foods and high saturated fats (high saturated fats less than 7% of calories).  Add Flax Seed/Fish Oil supplements to diet. Increase dietary fiber.  Regular exercise can reduce LDL and raise HDL. Stressed importance of physical activity 5 times per week for 30 minutes per day.     Assessment & Plan:  Lab Results   Component Value Date    TRIG 78 09/18/2024    HDL 78 09/18/2024    LDLCALC 67 09/18/2024     At goal   Continue above medication at same dose    Orders:  -     CBC Auto Differential; Future; Expected date: 03/25/2025  -     Comprehensive Metabolic Panel; Future; Expected date: 03/25/2025  -     Lipid Panel; Future; Expected date: 03/25/2025  -      TSH; Future; Expected date: 03/25/2025  -     Hemoglobin A1C; Future; Expected date: 03/25/2025  -     Urinalysis; Future; Expected date: 03/25/2025  -     PSA, Screening; Future; Expected date: 03/25/2025  -     Microalbumin/Creatinine Ratio, Urine; Future; Expected date: 03/25/2025  -     Vitamin D; Future; Expected date: 03/25/2025       [facility]    Follow up in about 6 months (around 3/25/2025) for Follow Up, With Labs Prior to Visit. In addition to their scheduled follow up, the patient has also been instructed to follow up on as needed basis.     Future Appointments   Date Time Provider Department Center   3/18/2025  8:20 AM LAB, [facility]   3/25/2025 10:00 AM Eliezer Stapleton Sr., MD [facility] [place] David Trahan, MD

## 2024-09-25 NOTE — ASSESSMENT & PLAN NOTE
Refill above medications as needed  Continue [facility] with [facility] for further evaluation as needed

## 2024-09-25 NOTE — ASSESSMENT & PLAN NOTE
Lab Results   Component Value Date    TRIG 78 09/18/2024    HDL 78 09/18/2024    LDLCALC 67 09/18/2024     At goal   Continue above medication at same dose

## 2024-10-02 ENCOUNTER — TELEPHONE (OUTPATIENT)
Dept: FAMILY MEDICINE | Facility: CLINIC | Age: 64
End: 2024-10-02
Payer: COMMERCIAL

## 2024-10-02 DIAGNOSIS — E78.2 MIXED HYPERLIPIDEMIA: ICD-10-CM

## 2024-10-02 RX ORDER — ATORVASTATIN CALCIUM 20 MG/1
20 TABLET, FILM COATED ORAL NIGHTLY
Qty: 90 TABLET | Refills: 3 | Status: SHIPPED | OUTPATIENT
Start: 2024-10-02

## 2024-10-09 LAB — CRC RECOMMENDATION EXT: NORMAL

## 2024-10-11 ENCOUNTER — DOCUMENTATION ONLY (OUTPATIENT)
Dept: FAMILY MEDICINE | Facility: CLINIC | Age: 64
End: 2024-10-11
Payer: COMMERCIAL

## 2024-10-28 ENCOUNTER — OFFICE VISIT (OUTPATIENT)
Dept: FAMILY MEDICINE | Facility: CLINIC | Age: 64
End: 2024-10-28
Payer: COMMERCIAL

## 2024-10-28 VITALS
RESPIRATION RATE: 20 BRPM | HEART RATE: 60 BPM | TEMPERATURE: 98 F | DIASTOLIC BLOOD PRESSURE: 71 MMHG | HEIGHT: 70 IN | OXYGEN SATURATION: 98 % | WEIGHT: 194.81 LBS | SYSTOLIC BLOOD PRESSURE: 122 MMHG | BODY MASS INDEX: 27.89 KG/M2

## 2024-10-28 DIAGNOSIS — M79.609 POPLITEAL PAIN: ICD-10-CM

## 2024-10-28 DIAGNOSIS — G89.29 CHRONIC BILATERAL LOW BACK PAIN WITH BILATERAL SCIATICA: Primary | ICD-10-CM

## 2024-10-28 DIAGNOSIS — M54.41 CHRONIC BILATERAL LOW BACK PAIN WITH BILATERAL SCIATICA: Primary | ICD-10-CM

## 2024-10-28 DIAGNOSIS — M54.42 CHRONIC BILATERAL LOW BACK PAIN WITH BILATERAL SCIATICA: Primary | ICD-10-CM

## 2024-10-28 PROCEDURE — 1159F MED LIST DOCD IN RCRD: CPT | Mod: CPTII,,, | Performed by: FAMILY MEDICINE

## 2024-10-28 PROCEDURE — 3078F DIAST BP <80 MM HG: CPT | Mod: CPTII,,, | Performed by: FAMILY MEDICINE

## 2024-10-28 PROCEDURE — 1160F RVW MEDS BY RX/DR IN RCRD: CPT | Mod: CPTII,,, | Performed by: FAMILY MEDICINE

## 2024-10-28 PROCEDURE — 3008F BODY MASS INDEX DOCD: CPT | Mod: CPTII,,, | Performed by: FAMILY MEDICINE

## 2024-10-28 PROCEDURE — 3044F HG A1C LEVEL LT 7.0%: CPT | Mod: CPTII,,, | Performed by: FAMILY MEDICINE

## 2024-10-28 PROCEDURE — 99214 OFFICE O/P EST MOD 30 MIN: CPT | Mod: ,,, | Performed by: FAMILY MEDICINE

## 2024-10-28 PROCEDURE — 3074F SYST BP LT 130 MM HG: CPT | Mod: CPTII,,, | Performed by: FAMILY MEDICINE

## 2024-10-28 RX ORDER — METHYLPREDNISOLONE 4 MG/1
4 TABLET ORAL
COMMUNITY
Start: 2024-10-22

## 2024-10-28 RX ORDER — GUAIFENESIN AND PHENYLEPHRINE HCL 400; 10 MG/1; MG/1
500 TABLET ORAL DAILY
COMMUNITY

## 2025-01-13 ENCOUNTER — TELEPHONE (OUTPATIENT)
Dept: FAMILY MEDICINE | Facility: CLINIC | Age: 65
End: 2025-01-13
Payer: COMMERCIAL

## 2025-01-13 DIAGNOSIS — J31.0 RHINITIS, UNSPECIFIED TYPE: ICD-10-CM

## 2025-01-13 RX ORDER — MONTELUKAST SODIUM 10 MG/1
10 TABLET ORAL DAILY
Qty: 90 TABLET | Refills: 3 | Status: SHIPPED | OUTPATIENT
Start: 2025-01-13

## 2025-02-13 ENCOUNTER — OFFICE VISIT (OUTPATIENT)
Dept: FAMILY MEDICINE | Facility: CLINIC | Age: 65
End: 2025-02-13
Payer: MEDICARE

## 2025-02-13 VITALS
OXYGEN SATURATION: 99 % | BODY MASS INDEX: 27.77 KG/M2 | SYSTOLIC BLOOD PRESSURE: 129 MMHG | RESPIRATION RATE: 20 BRPM | TEMPERATURE: 98 F | HEIGHT: 70 IN | HEART RATE: 60 BPM | DIASTOLIC BLOOD PRESSURE: 84 MMHG | WEIGHT: 194 LBS

## 2025-02-13 DIAGNOSIS — H92.03 ACUTE OTALGIA, BILATERAL: ICD-10-CM

## 2025-02-13 DIAGNOSIS — H61.23 BILATERAL IMPACTED CERUMEN: Primary | ICD-10-CM

## 2025-02-13 RX ORDER — DEXBROMPHENIRAMINE MALEATE 2 MG/1
1 TABLET ORAL EVERY 6 HOURS PRN
Qty: 20 TABLET | Refills: 0 | Status: SHIPPED | OUTPATIENT
Start: 2025-02-13 | End: 2025-02-18

## 2025-02-13 RX ORDER — METHYLPREDNISOLONE 4 MG/1
TABLET ORAL
Qty: 1 EACH | Refills: 0 | Status: SHIPPED | OUTPATIENT
Start: 2025-02-13

## 2025-02-13 NOTE — PROGRESS NOTES
"  Patient ID: [id number]     Chief Complaint: Sore Throat and Ear Fullness (Started a month or two ago. Took penicillin that he had at home and it cleared up for 2 weeks and then it came back)    HPI:     Joey Hunt is a 64-year-old male who presents today with a sensation of ear fullness and a sore throat that began approximately two months ago. He reports having taken Penicillin from his home, which initially alleviated his symptoms, but they have since returned. He denies experiencing any fevers, chest congestion, or sinus congestion.    Past Medical History:   Diagnosis Date    Allergies     Chronic venous insufficiency     Elevated LFTs     Folate deficiency     GERD (gastroesophageal reflux disease)     H/O ETOH abuse     History of gross hematuria     HTN (hypertension)     Hypercholesterolemia     Memory impairment     Mixed hyperlipidemia     Prostate enlargement     PTSD (post-traumatic stress disorder)     [facility]    Vitamin D deficiency         Past Surgical History:   Procedure Laterality Date    COLONOSCOPY  2020    Dr Velasquez repeat 4 years    LEFT HEART CATHETERIZATION      NASAL SEPTUM SURGERY          Social History     Socioeconomic History    Marital status:     Number of children: 2   Tobacco Use    Smoking status: Former     Current packs/day: 0.00     Types: Cigarettes     Quit date:      Years since quittin.1    Smokeless tobacco: Never   Substance and Sexual Activity    Alcohol use: Yes     Alcohol/week: 8.0 standard drinks of alcohol     Types: 8 Shots of liquor per week    Drug use: Not Currently     Types: Cocaine, Marijuana, "Crack" cocaine     Comment: Former     Social Drivers of Health     Financial Resource Strain: Low Risk  (2025)    Overall Financial Resource Strain (CARDIA)     Difficulty of Paying Living Expenses: Not very hard   Food Insecurity: No Food Insecurity (2025)    Hunger Vital Sign     Worried About Running Out of Food in the Last Year: " Never true     Ran Out of Food in the Last Year: Never true   Transportation Needs: No Transportation Needs (2/13/2025)    PRAPARE - Transportation     Lack of Transportation (Medical): No     Lack of Transportation (Non-Medical): No   Physical Activity: Sufficiently Active (2/13/2025)    Exercise Vital Sign     Days of Exercise per Week: 5 days     Minutes of Exercise per Session: 60 min   Stress: No Stress Concern Present (2/13/2025)    [employer] [place] [employer] - Occupational Stress Questionnaire     Feeling of Stress : Not at all   Housing Stability: Low Risk  (2/13/2025)    Housing Stability Vital Sign     Unable to Pay for Housing in the Last Year: No     Homeless in the Last Year: No      Current Outpatient Medications   Medication Instructions    arginine 500 mg, Daily    aspirin 81 MG Chew 1 tablet, Daily    atorvastatin (LIPITOR) 20 mg, Oral, Nightly    cholecalciferol (vitamin D3) (VITAMIN D3) 1,000 Units, Daily    COD LIVER OIL ORAL 1 capsule, Daily    cyanocobalamin 500 mcg, Daily    dexbrompheniramine maleate (ALA-HIST IR) 2 mg Tab 1 tablet, Oral, Every 6 hours PRN    esomeprazole (NEXIUM) 40 mg, Oral, Daily    finasteride (PROSCAR) 5 mg, Daily    flaxseed oiL 1,000 mg Cap 1 capsule, Daily    fluticasone (VERAMYST) 27.5 mcg/actuation nasal spray 1 spray, Daily PRN    folic acid (FOLVITE) 400 mcg, Daily    ibuprofen (ADVIL,MOTRIN) 800 MG tablet 1 tablet, Daily PRN    methylPREDNISolone (MEDROL DOSEPACK) 4 mg tablet use as directed    montelukast (SINGULAIR) 10 mg, Oral, Daily, prn    multivit-mins/iron/folic/lycop (CENTRUM MEN ORAL) 1 tablet, Daily    omega-3 fatty acids/fish oil (FISH OIL-OMEGA-3 FATTY ACIDS) 300-1,000 mg capsule 1 capsule, Daily    Panax ginseng root 1,000 mg Tab 1 tablet, Daily    sertraline (ZOLOFT) 50 mg, Daily    tadalafiL (CIALIS) 20 mg, Daily PRN    tamsulosin (FLOMAX) 0.4 mg Cap 1 capsule, Nightly    telmisartan-hydrochlorothiazide (MICARDIS HCT) 80-12.5 mg per  "tablet 0.5 tablets, Daily    traZODone (DESYREL) 25 mg, Nightly PRN    turmeric root extract 500 mg, Daily    ubidecarenone/vitamin E mixed (COQ10  ORAL) 1 capsule, Daily       Review of patient's allergies indicates:  No Known Allergies     Patient Care Team:  Eliezer Stapleton Sr., MD as PCP - General (Family Medicine)  Clinic, [facility]  Tramaine Gonzales MD as Consulting Physician (Urology)  Cayetano Nicholas MD as Consulting Physician (Cardiology)  Jewell Laguna, OD (Optometry)     Subjective:     Review of Systems    12 point review of systems conducted, negative except as stated in the history of present illness. See HPI for details.    Objective:     Visit Vitals  /84 (BP Location: Right arm, Patient Position: Sitting)   Pulse 60   Temp 98 °F (36.7 °C) (Temporal)   Resp 20   Ht 5' 10" (1.778 m)   Wt 88 kg (194 lb)   SpO2 99%   BMI 27.84 kg/m²       Physical Exam  Vitals and nursing note reviewed.   Constitutional:       General: He is not in acute distress.     Appearance: He is not ill-appearing.   HENT:      Head: Normocephalic and atraumatic.      Right Ear: Hearing normal. There is impacted cerumen. Tympanic membrane is not injected, scarred, perforated, erythematous, retracted or bulging.      Left Ear: Hearing normal. There is impacted cerumen. Tympanic membrane is not injected, scarred, perforated, erythematous, retracted or bulging.      Nose: Nose normal.      Right Turbinates: Not enlarged.      Left Turbinates: Not enlarged.      Mouth/Throat:      Mouth: Mucous membranes are moist.      Pharynx: Oropharynx is clear. Uvula midline. Postnasal drip present. No uvula swelling.   Eyes:      General: No scleral icterus.     Extraocular Movements: Extraocular movements intact.      Conjunctiva/sclera: Conjunctivae normal.      Pupils: Pupils are equal, round, and reactive to light.   Neck:      Vascular: No carotid bruit.   Cardiovascular:      Rate and Rhythm: Normal " rate and regular rhythm.      Heart sounds: No murmur heard.     No friction rub. No gallop.   Pulmonary:      Effort: Pulmonary effort is normal. No respiratory distress.      Breath sounds: Normal breath sounds. No wheezing, rhonchi or rales.   Abdominal:      General: Abdomen is flat. Bowel sounds are normal. There is no distension.      Palpations: Abdomen is soft. There is no mass.      Tenderness: There is no abdominal tenderness.   Musculoskeletal:         General: Normal range of motion.      Cervical back: Normal range of motion and neck supple.   Skin:     General: Skin is warm and dry.   Neurological:      General: No focal deficit present.      Mental Status: He is alert.   Psychiatric:         Mood and Affect: Mood normal.       Labs Reviewed:     Chemistry:  Lab Results   Component Value Date     09/18/2024    K 4.0 09/18/2024    BUN 14 09/18/2024    CREATININE 1.15 09/18/2024    EGFRNORACEVR 71 09/18/2024    CALCIUM 9.6 09/18/2024    LABPROT 13.3 11/13/2021    ALBUMIN 4.4 09/18/2024    BILIDIR <0.20 03/13/2024    AST 34 09/18/2024    ALT 27 09/18/2024    [id number] 38.2 09/18/2024    TSH 1.080 09/18/2024        Lab Results   Component Value Date    HGBA1C 5.3 09/18/2024        Hematology:  Lab Results   Component Value Date    WBC 6.4 09/18/2024    HGB 13.8 09/18/2024    HCT 41.5 09/18/2024     09/18/2024       Lipid Panel:  Lab Results   Component Value Date    CHOL 160 09/18/2024    HDL 78 09/18/2024    TRIG 78 09/18/2024      Assessment:       ICD-10-CM ICD-9-CM   1. Bilateral impacted cerumen  H61.23 380.4   2. Acute otalgia, bilateral  H92.03 388.70        Plan:     1. Bilateral impacted cerumen  -     Ear Cerumen Removal    2. Acute otalgia, bilateral  -     methylPREDNISolone (MEDROL DOSEPACK) 4 mg tablet; use as directed  Dispense: 1 each; Refill: 0  -     dexbrompheniramine maleate (ALA-HIST IR) 2 mg Tab; Take 1 tablet by mouth every 6 (six) hours as needed (sinus and ear  congestion).  Dispense: 20 tablet; Refill: 0    Avoid Irritants and allergens.  Wash hands frequently to prevent common colds.  Drink plenty of fluids to thin mucous and/or use humidifier.      No follow-ups on file. In addition to their scheduled follow up, the patient has also been instructed to follow up on as needed basis.     Katelynn Shelton NP

## 2025-02-13 NOTE — PROCEDURES
Ear Cerumen Removal    Date/Time: 2/13/2025 2:00 PM    Performed by: Katelynn Shelton NP  Authorized by: Katelynn Shelton NP    Consent Done?:  Yes (Verbal)  Location details:  Both ears  Procedure type: curette and irrigation    Cerumen  Removal Results:  Cerumen completely removed  Patient tolerance:  Patient tolerated the procedure well with no immediate complications

## 2025-03-19 ENCOUNTER — RESULTS FOLLOW-UP (OUTPATIENT)
Dept: FAMILY MEDICINE | Facility: CLINIC | Age: 65
End: 2025-03-19

## 2025-03-24 ENCOUNTER — OFFICE VISIT (OUTPATIENT)
Dept: FAMILY MEDICINE | Facility: CLINIC | Age: 65
End: 2025-03-24
Payer: MEDICARE

## 2025-03-24 VITALS
WEIGHT: 194 LBS | DIASTOLIC BLOOD PRESSURE: 67 MMHG | TEMPERATURE: 98 F | HEART RATE: 60 BPM | OXYGEN SATURATION: 99 % | BODY MASS INDEX: 27.77 KG/M2 | HEIGHT: 70 IN | SYSTOLIC BLOOD PRESSURE: 106 MMHG | RESPIRATION RATE: 18 BRPM

## 2025-03-24 DIAGNOSIS — I10 PRIMARY HYPERTENSION: ICD-10-CM

## 2025-03-24 DIAGNOSIS — E55.9 VITAMIN D DEFICIENCY: ICD-10-CM

## 2025-03-24 DIAGNOSIS — J42 CHRONIC BRONCHITIS, UNSPECIFIED CHRONIC BRONCHITIS TYPE: ICD-10-CM

## 2025-03-24 DIAGNOSIS — E78.2 MIXED HYPERLIPIDEMIA: Primary | ICD-10-CM

## 2025-03-24 DIAGNOSIS — Z12.5 ENCOUNTER FOR SCREENING FOR MALIGNANT NEOPLASM OF PROSTATE: ICD-10-CM

## 2025-03-24 DIAGNOSIS — F33.1 MAJOR DEPRESSIVE DISORDER, RECURRENT, MODERATE: ICD-10-CM

## 2025-03-24 DIAGNOSIS — R79.9 ABNORMAL FINDING OF BLOOD CHEMISTRY, UNSPECIFIED: ICD-10-CM

## 2025-03-24 PROCEDURE — 1159F MED LIST DOCD IN RCRD: CPT | Mod: CPTII,,, | Performed by: FAMILY MEDICINE

## 2025-03-24 PROCEDURE — 3008F BODY MASS INDEX DOCD: CPT | Mod: CPTII,,, | Performed by: FAMILY MEDICINE

## 2025-03-24 PROCEDURE — 3078F DIAST BP <80 MM HG: CPT | Mod: CPTII,,, | Performed by: FAMILY MEDICINE

## 2025-03-24 PROCEDURE — G2211 COMPLEX E/M VISIT ADD ON: HCPCS | Mod: ,,, | Performed by: FAMILY MEDICINE

## 2025-03-24 PROCEDURE — 99214 OFFICE O/P EST MOD 30 MIN: CPT | Mod: ,,, | Performed by: FAMILY MEDICINE

## 2025-03-24 PROCEDURE — 1101F PT FALLS ASSESS-DOCD LE1/YR: CPT | Mod: CPTII,,, | Performed by: FAMILY MEDICINE

## 2025-03-24 PROCEDURE — 3074F SYST BP LT 130 MM HG: CPT | Mod: CPTII,,, | Performed by: FAMILY MEDICINE

## 2025-03-24 PROCEDURE — 3044F HG A1C LEVEL LT 7.0%: CPT | Mod: CPTII,,, | Performed by: FAMILY MEDICINE

## 2025-03-24 PROCEDURE — 3288F FALL RISK ASSESSMENT DOCD: CPT | Mod: CPTII,,, | Performed by: FAMILY MEDICINE

## 2025-03-24 RX ORDER — TRAMADOL HYDROCHLORIDE 50 MG/1
50 TABLET ORAL EVERY 8 HOURS PRN
COMMUNITY
Start: 2024-10-29

## 2025-03-24 NOTE — PROGRESS NOTES
"  Family Medicine    Patient ID: [id number]     Chief Complaint: Results      HPI:     Joey Hunt is a 64 y.o. male here today for lab review.    A1c at goal  Lipids at goal   Hypertension controlled  Declines AAA screening today, [facility]  Return six-months    MEDICAL HISTORY:       Past Medical History:   Diagnosis Date    Allergies     Chronic venous insufficiency     Elevated LFTs     Folate deficiency     GERD (gastroesophageal reflux disease)     H/O ETOH abuse     History of gross hematuria     HTN (hypertension)     Hypercholesterolemia     Memory impairment     Mixed hyperlipidemia     Prostate enlargement     PTSD (post-traumatic stress disorder)     [facility]    Vitamin D deficiency         Past Surgical History:   Procedure Laterality Date    COLONOSCOPY  2020    Dr Velasquez repeat 4 years    LEFT HEART CATHETERIZATION      NASAL SEPTUM SURGERY          Social History     Tobacco Use    Smoking status: Former     Current packs/day: 0.00     Types: Cigarettes     Quit date:      Years since quittin.2    Smokeless tobacco: Never   Substance and Sexual Activity    Alcohol use: Yes     Alcohol/week: 8.0 standard drinks of alcohol     Types: 8 Shots of liquor per week    Drug use: Not Currently     Types: Cocaine, Marijuana, "Crack" cocaine     Comment: Former    Sexual activity: Not on file        Current Outpatient Medications   Medication Instructions    arginine 500 mg, Daily    aspirin 81 MG Chew 1 tablet, Daily    atorvastatin (LIPITOR) 20 mg, Oral, Nightly    cholecalciferol (vitamin D3) (VITAMIN D3) 1,000 Units, Daily    COD LIVER OIL ORAL 1 capsule, Daily    cyanocobalamin 500 mcg, Daily    esomeprazole (NEXIUM) 40 mg, Oral, Daily    finasteride (PROSCAR) 5 mg, Daily    flaxseed oiL 1,000 mg Cap 1 capsule, Daily    fluticasone (VERAMYST) 27.5 mcg/actuation nasal spray 1 spray, Daily PRN    folic acid (FOLVITE) 400 mcg, Daily    ibuprofen (ADVIL,MOTRIN) 800 MG " tablet 1 tablet, Daily PRN    methylPREDNISolone (MEDROL DOSEPACK) 4 mg tablet use as directed    montelukast (SINGULAIR) 10 mg, Oral, Daily, prn    multivit-mins/iron/folic/lycop (CENTRUM MEN ORAL) 1 tablet, Daily    omega-3 fatty acids/fish oil (FISH OIL-OMEGA-3 FATTY ACIDS) 300-1,000 mg capsule 1 capsule, Daily    Panax ginseng root 1,000 mg Tab 1 tablet, Daily    sertraline (ZOLOFT) 50 mg, Daily    tadalafiL (CIALIS) 20 mg, Daily PRN    tamsulosin (FLOMAX) 0.4 mg Cap 1 capsule, Nightly    telmisartan-hydrochlorothiazide (MICARDIS HCT) 80-12.5 mg per tablet 0.5 tablets, Daily    traMADoL (ULTRAM) 50 mg, Every 8 hours PRN    traZODone (DESYREL) 25 mg, Nightly PRN    turmeric root extract 500 mg, Daily    ubidecarenone/vitamin E mixed (COQ10  ORAL) 1 capsule, Daily       Review of patient's allergies indicates:  No Known Allergies     Patient Care Team:  Eliezer Stapleton Sr., MD as PCP - General (Family Medicine)  Clinic, [facility]  Tramaine Gonzales MD as Consulting Physician (Urology)  Cayetano Nicholas MD as Consulting Physician (Cardiology)  Jewell Laguna, CAROLEE (Optometry)     Subjective:     Review of Systems   Constitutional:  Negative for activity change, appetite change, fever and unexpected weight change.   HENT:  Negative for congestion, dental problem, rhinorrhea and trouble swallowing.    Eyes:  Negative for visual disturbance.   Respiratory:  Negative for chest tightness and shortness of breath.    Cardiovascular:  Negative for chest pain, palpitations and leg swelling.   Gastrointestinal:  Negative for abdominal pain, blood in stool, constipation, diarrhea, nausea and vomiting.   Genitourinary:  Negative for difficulty urinating.   Musculoskeletal:  Negative for gait problem.   Skin:  Negative for rash and wound.   Neurological:  Negative for dizziness, syncope, speech difficulty, weakness and light-headedness.   Psychiatric/Behavioral:  Negative for confusion and suicidal  "ideas.        12 point review of systems conducted, negative except as stated in the history of present illness. See HPI for details.    Objective:     Visit Vitals  /67   Pulse 60   Temp 98.2 °F (36.8 °C) (Skin)   Resp 18   Ht 5' 10" (1.778 m)   Wt 88 kg (194 lb)   SpO2 99%   BMI 27.84 kg/m²       Physical Exam  Vitals and nursing note reviewed.   Constitutional:       General: He is not in acute distress.     Appearance: Normal appearance. He is not ill-appearing, toxic-appearing or diaphoretic.   HENT:      Head: Normocephalic and atraumatic.      Right Ear: Tympanic membrane, ear canal and external ear normal. There is no impacted cerumen.      Left Ear: Tympanic membrane, ear canal and external ear normal. There is no impacted cerumen.      Nose: No congestion or rhinorrhea.      Mouth/Throat:      Pharynx: Oropharynx is clear. No oropharyngeal exudate or posterior oropharyngeal erythema.   Eyes:      General:         Right eye: No discharge.         Left eye: No discharge.      Extraocular Movements: Extraocular movements intact.      Conjunctiva/sclera: Conjunctivae normal.   Cardiovascular:      Rate and Rhythm: Normal rate and regular rhythm.      Heart sounds: No murmur heard.     No friction rub. No gallop.   Pulmonary:      Effort: Pulmonary effort is normal. No respiratory distress.      Breath sounds: Normal breath sounds.   Musculoskeletal:      Right lower leg: No edema.      Left lower leg: No edema.   Skin:     Capillary Refill: Capillary refill takes less than 2 seconds.   Neurological:      Mental Status: He is alert and oriented to person, place, and time.   Psychiatric:         Mood and Affect: Mood normal.         Behavior: Behavior normal.         Thought Content: Thought content normal.         Recent labs:     Chemistry:  Lab Results   Component Value Date     03/18/2025    K 4.6 03/18/2025    BUN 14 03/18/2025    CREATININE 1.13 03/18/2025    EGFRNORACEVR 72 03/18/2025    " "CALCIUM 9.3 03/18/2025    LABPROT 13.3 11/13/2021    ALBUMIN 3.9 03/18/2025    BILIDIR <0.20 03/13/2024    AST 32 03/18/2025    ALT 37 03/18/2025    HBMYSYVD70OS 45.7 03/18/2025    TSH 1.240 03/18/2025        Lab Results   Component Value Date    HGBA1C 5.3 03/18/2025        Hematology:  Lab Results   Component Value Date    WBC 6.8 03/18/2025    HGB 13.1 03/18/2025    HCT 40.4 03/18/2025     03/18/2025       Lipid Panel:  Lab Results   Component Value Date    CHOL 149 03/18/2025    HDL 63 03/18/2025    TRIG 67 03/18/2025        Urine:  No results found for: "COLORUA", "APPEARANCEUA", "SGUA", "PHUA", "PROTEINUA", "GLUCOSEUA", "KETONESUA", "BLOODUA", "NITRITESUA", "LEUKOCYTESUR", "RBCUA", "WBCUA", "BACTERIA", "SQEPUA", "HYALINECASTS", "CREATRANDUR", "PROTEINURINE", "UPROTCREA"     Assessment:       ICD-10-CM ICD-9-CM   1. Mixed hyperlipidemia  E78.2 272.2   2. Major depressive disorder, recurrent, moderate  F33.1 296.32   3. Chronic bronchitis, unspecified chronic bronchitis type  J42 491.9   4. Primary hypertension  I10 401.9   5. Vitamin D deficiency  E55.9 268.9   6. Abnormal finding of blood chemistry, unspecified  R79.9 790.6   7. Encounter for screening for malignant neoplasm of prostate  Z12.5 V76.44        Plan:     1. Mixed hyperlipidemia  Overview:  Continue with Lipitor 20 mg nightly.    Stressed importance of dietary modifications. Follow a low cholesterol, low saturated fat diet with less that 200mg of cholesterol a day.  Avoid fried foods and high saturated fats (high saturated fats less than 7% of calories).  Add Flax Seed/Fish Oil supplements to diet. Increase dietary fiber.  Regular exercise can reduce LDL and raise HDL. Stressed importance of physical activity 5 times per week for 30 minutes per day.     Assessment & Plan:  Lab Results   Component Value Date    TRIG 67 03/18/2025    HDL 63 03/18/2025    LDLCALC 73 03/18/2025     At goal   Continue above medication at same dose    Orders:  -   "   CBC Auto Differential; Future; Expected date: 09/24/2025  -     Comprehensive Metabolic Panel; Future; Expected date: 09/24/2025  -     Lipid Panel; Future; Expected date: 09/24/2025  -     TSH; Future; Expected date: 09/24/2025  -     Hemoglobin A1C; Future; Expected date: 09/24/2025  -     Urinalysis; Future; Expected date: 09/24/2025  -     PSA, Screening; Future; Expected date: 09/24/2025  -     Microalbumin/Creatinine Ratio, Urine; Future; Expected date: 09/24/2025  -     Vitamin D; Future; Expected date: 09/24/2025    2. Major depressive disorder, recurrent, moderate  Overview:  Takes Zoloft and trazodone, prescribed by [facility]  Has suffered from depression since getting discharge from the [employer]    Assessment & Plan:  Refill above medications as needed  Continue following up with the [facility] doctor for further evaluation as needed    Orders:  -     CBC Auto Differential; Future; Expected date: 09/24/2025  -     Comprehensive Metabolic Panel; Future; Expected date: 09/24/2025  -     Lipid Panel; Future; Expected date: 09/24/2025  -     TSH; Future; Expected date: 09/24/2025  -     Hemoglobin A1C; Future; Expected date: 09/24/2025  -     Urinalysis; Future; Expected date: 09/24/2025  -     PSA, Screening; Future; Expected date: 09/24/2025  -     Microalbumin/Creatinine Ratio, Urine; Future; Expected date: 09/24/2025  -     Vitamin D; Future; Expected date: 09/24/2025    3. Chronic bronchitis, unspecified chronic bronchitis type  Overview:  Exposed to burn pits while in the [employer]  Made some sort of claim for the exposure  Had repeated bouts of chronic bronchitis which essentially entailed chronic coughing  Has not had any coughing episodes in the last several years    Assessment & Plan:  His [facility] doctor manages this nebulous chronic bronchitis and had x-rays a couple of weeks  Continue following [facility] with the [facility] for monitoring  Lung exam normal today    Orders:  -     CBC Auto Differential; Future; Expected date:  09/24/2025  -     Comprehensive Metabolic Panel; Future; Expected date: 09/24/2025  -     Lipid Panel; Future; Expected date: 09/24/2025  -     TSH; Future; Expected date: 09/24/2025  -     Hemoglobin A1C; Future; Expected date: 09/24/2025  -     Urinalysis; Future; Expected date: 09/24/2025  -     PSA, Screening; Future; Expected date: 09/24/2025  -     Microalbumin/Creatinine Ratio, Urine; Future; Expected date: 09/24/2025  -     Vitamin D; Future; Expected date: 09/24/2025    4. Primary hypertension  Overview:  Continue with Aspirin 81 mg daily + Micardis HCT 80-12.5 mg 1/2tab daily.      Low Sodium Diet (DASH Diet - Less than 2 grams of sodium per day).  Monitor blood pressure daily and log. Report consistent numbers greater than 140/90.  Maintain healthy weight with goal BMI <30. Exercise 30 minutes per day, 5 days per week.    Assessment & Plan:  At goal   Continue above medication at same dose  Continue follow up with Cardiology    Orders:  -     CBC Auto Differential; Future; Expected date: 09/24/2025  -     Comprehensive Metabolic Panel; Future; Expected date: 09/24/2025  -     Lipid Panel; Future; Expected date: 09/24/2025  -     TSH; Future; Expected date: 09/24/2025  -     Hemoglobin A1C; Future; Expected date: 09/24/2025  -     Urinalysis; Future; Expected date: 09/24/2025  -     PSA, Screening; Future; Expected date: 09/24/2025  -     Microalbumin/Creatinine Ratio, Urine; Future; Expected date: 09/24/2025  -     Vitamin D; Future; Expected date: 09/24/2025    5. Vitamin D deficiency  Overview:  Background:  Vitamin-D level today:  45  Preferred range per Endocrine Society is 40-60 ng/mL (Ref)  Current supplementation:  1000 IU daily    Plan:  Continue current supplementation   Repeat 6 months      Orders:  -     CBC Auto Differential; Future; Expected date: 09/24/2025  -     Comprehensive Metabolic Panel; Future; Expected date: 09/24/2025  -     Lipid Panel; Future; Expected date: 09/24/2025  -      TSH; Future; Expected date: 09/24/2025  -     Hemoglobin A1C; Future; Expected date: 09/24/2025  -     Urinalysis; Future; Expected date: 09/24/2025  -     PSA, Screening; Future; Expected date: 09/24/2025  -     Microalbumin/Creatinine Ratio, Urine; Future; Expected date: 09/24/2025  -     Vitamin D; Future; Expected date: 09/24/2025    6. Abnormal finding of blood chemistry, unspecified  -     Hemoglobin A1C; Future; Expected date: 09/24/2025    7. Encounter for screening for malignant neoplasm of prostate  -     PSA, Screening; Future; Expected date: 09/24/2025         No follow-ups on file. In addition to their scheduled follow up, the patient has also been instructed to follow up on as needed basis.     Future Appointments   Date Time Provider Department Center   9/23/2025  8:20 AM LAB, [facility] [place]   9/29/2025  3:00 PM PROVIDER, [facility] Hare MD

## 2025-03-24 NOTE — ASSESSMENT & PLAN NOTE
Lab Results   Component Value Date    TRIG 67 03/18/2025    HDL 63 03/18/2025    LDLCALC 73 03/18/2025     At goal   Continue above medication at same dose

## 2025-07-10 DIAGNOSIS — R51.9 HEAD ACHE: Primary | ICD-10-CM

## 2025-08-06 NOTE — PROGRESS NOTES
Subjective     Patient ID: Joey Hunt is a 64 y.o. male.    Chief Complaint: Headache (Headache, re-referred by [facility] Auth: [id number] exp 2-3-2026 , Headaches (Headaches, Cluster Headaches, Migraine, Nerve Block, Botox, Trigeminal Neuralgia) /)    HPI  Last seen Jan 2023 by Cruz (NP appt for memory loss)  Had reported tension HA back then  Was [facility] by the [facility] for [facility]    Tension HA  Daily for the past 6-12mo  Has tried stretching, but hasn't had improvement    Prev tried &/or failed: trazodone, tramadol, sertraline, steroids     Review of Systems  A 14pt ROS was reviewed & is negative unless otherwise documented in the HPI       Objective     Physical Exam  GENERAL: NAD, calm, cooperative, appropriate  Awake/alert  Well groomed  RESP: CTAB  HEART: RRR  No LE edema  MENTAL STATUS: oriented, follow commands reliably  SPEECH/LANGUAGE: clear, fluent  CN:  Perrla, eomi, vff, gaze conjugate  No tactile or motor facial asymmetry  Tongue protrudes midline  Motor: no focal weakness  Bilat traps are tight & he has occipital tenderness  Cerebellar: no tremor or dysmetria  Sensory: normal to tactile stim/vibration  DTRs: normal +2, symmetric  Gait: steady       Assessment and Plan     1. Chronic tension-type headache, intractable  -     cyclobenzaprine (FLEXERIL) 5 MG tablet; Take 1 tablet (5 mg total) by mouth 3 (three) times daily as needed for Muscle spasms.  Dispense: 90 tablet; Refill: 5  -     Cancel: Ambulatory Referral/Consult to Physical Therapy  -     Ambulatory Referral/Consult to Physical Therapy    Try flexeril 5mg up to tid prn - discussed s/e  Refer to PT for therapy/dry needling  F/u 3-4 mo    MAREK Long-BC       Follow up in about 3 months (around 11/7/2025) for tension HA.

## 2025-08-07 ENCOUNTER — OFFICE VISIT (OUTPATIENT)
Dept: NEUROLOGY | Facility: CLINIC | Age: 65
End: 2025-08-07
Payer: OTHER GOVERNMENT

## 2025-08-07 VITALS
DIASTOLIC BLOOD PRESSURE: 80 MMHG | SYSTOLIC BLOOD PRESSURE: 122 MMHG | HEIGHT: 71 IN | WEIGHT: 195 LBS | RESPIRATION RATE: 16 BRPM | BODY MASS INDEX: 27.3 KG/M2

## 2025-08-07 DIAGNOSIS — G44.221 CHRONIC TENSION-TYPE HEADACHE, INTRACTABLE: Primary | ICD-10-CM

## 2025-08-07 PROCEDURE — 99214 OFFICE O/P EST MOD 30 MIN: CPT | Mod: S$PBB,,, | Performed by: NURSE PRACTITIONER

## 2025-08-07 PROCEDURE — 99215 OFFICE O/P EST HI 40 MIN: CPT | Mod: PBBFAC | Performed by: NURSE PRACTITIONER

## 2025-08-07 PROCEDURE — 99999 PR PBB SHADOW E&M-EST. PATIENT-LVL V: CPT | Mod: PBBFAC,,, | Performed by: NURSE PRACTITIONER

## 2025-08-07 RX ORDER — CYCLOBENZAPRINE HCL 5 MG
5 TABLET ORAL 3 TIMES DAILY PRN
Qty: 90 TABLET | Refills: 5 | Status: SHIPPED | OUTPATIENT
Start: 2025-08-07